# Patient Record
Sex: FEMALE | Race: WHITE | NOT HISPANIC OR LATINO | Employment: OTHER | URBAN - METROPOLITAN AREA
[De-identification: names, ages, dates, MRNs, and addresses within clinical notes are randomized per-mention and may not be internally consistent; named-entity substitution may affect disease eponyms.]

---

## 2018-03-20 ENCOUNTER — HOSPITAL ENCOUNTER (OUTPATIENT)
Facility: HOSPITAL | Age: 58
Discharge: HOME | End: 2018-03-20
Attending: EMERGENCY MEDICINE | Admitting: HOSPITALIST
Payer: COMMERCIAL

## 2018-03-20 VITALS
DIASTOLIC BLOOD PRESSURE: 61 MMHG | SYSTOLIC BLOOD PRESSURE: 121 MMHG | TEMPERATURE: 100 F | WEIGHT: 135 LBS | HEART RATE: 87 BPM | RESPIRATION RATE: 18 BRPM | BODY MASS INDEX: 22.49 KG/M2 | HEIGHT: 65 IN | OXYGEN SATURATION: 96 %

## 2018-03-20 DIAGNOSIS — S00.83XA CONTUSION OF FOREHEAD, INITIAL ENCOUNTER: ICD-10-CM

## 2018-03-20 DIAGNOSIS — R55 SYNCOPE AND COLLAPSE: ICD-10-CM

## 2018-03-20 DIAGNOSIS — I95.1 ORTHOSTATIC HYPOTENSION: Primary | ICD-10-CM

## 2018-03-20 PROBLEM — J20.9 ACUTE BRONCHITIS: Status: ACTIVE | Noted: 2018-03-20

## 2018-03-20 PROBLEM — E87.6 HYPOKALEMIA, GASTROINTESTINAL LOSSES: Status: ACTIVE | Noted: 2018-03-20

## 2018-03-20 PROBLEM — R19.7 DIARRHEA: Status: ACTIVE | Noted: 2018-03-20

## 2018-03-20 LAB
ALBUMIN SERPL BCP-MCNC: 3.5 G/DL
ALP SERPL-CCNC: 102 U/L
ALT SERPL W/O P-5'-P-CCNC: 47 U/L
ANION GAP SERPL CALC-SCNC: 11 MMOL/L
AST SERPL-CCNC: 25 U/L
BACTERIA #/AREA URNS HPF: ABNORMAL /HPF
BASOPHILS # BLD AUTO: 0 K/UL
BASOPHILS NFR BLD: 0.1 %
BILIRUB SERPL-MCNC: 0.4 MG/DL
BILIRUB UR QL STRIP: NEGATIVE
BNP SERPL-MCNC: <10 PG/ML
BUN SERPL-MCNC: 9 MG/DL
C DIFF GDH STL QL: NEGATIVE
C DIFF TOX A+B STL QL IA: NEGATIVE
CALCIUM SERPL-MCNC: 8.4 MG/DL
CHLORIDE SERPL-SCNC: 103 MMOL/L
CLARITY UR: CLEAR
CO2 SERPL-SCNC: 24 MMOL/L
COLOR UR: YELLOW
CREAT SERPL-MCNC: 0.7 MG/DL
D DIMER PPP IA.FEU-MCNC: 0.58 MG/L FEU
DIFFERENTIAL METHOD: ABNORMAL
EOSINOPHIL # BLD AUTO: 0 K/UL
EOSINOPHIL NFR BLD: 0.1 %
ERYTHROCYTE [DISTWIDTH] IN BLOOD BY AUTOMATED COUNT: 12.3 %
EST. GFR  (AFRICAN AMERICAN): >60 ML/MIN/1.73 M^2
EST. GFR  (NON AFRICAN AMERICAN): >60 ML/MIN/1.73 M^2
GLUCOSE SERPL-MCNC: 117 MG/DL
GLUCOSE UR QL STRIP: NEGATIVE
HCT VFR BLD AUTO: 41.3 %
HGB BLD-MCNC: 14.1 G/DL
HGB UR QL STRIP: ABNORMAL
HYALINE CASTS #/AREA URNS LPF: 1 /LPF
KETONES UR QL STRIP: NEGATIVE
LEUKOCYTE ESTERASE UR QL STRIP: NEGATIVE
LYMPHOCYTES # BLD AUTO: 0.7 K/UL
LYMPHOCYTES NFR BLD: 13.2 %
MAGNESIUM SERPL-MCNC: 2 MG/DL
MCH RBC QN AUTO: 29 PG
MCHC RBC AUTO-ENTMCNC: 34 G/DL
MCV RBC AUTO: 85 FL
MICROSCOPIC COMMENT: ABNORMAL
MONOCYTES # BLD AUTO: 0.6 K/UL
MONOCYTES NFR BLD: 12.9 %
NEUTROPHILS # BLD AUTO: 3.7 K/UL
NEUTROPHILS NFR BLD: 73.7 %
NITRITE UR QL STRIP: NEGATIVE
PH UR STRIP: 6 [PH] (ref 5–8)
PHOSPHATE SERPL-MCNC: 3.1 MG/DL
PLATELET # BLD AUTO: 243 K/UL
PMV BLD AUTO: 7.7 FL
POCT GLUCOSE: 104 MG/DL (ref 70–110)
POTASSIUM SERPL-SCNC: 3.4 MMOL/L
PROT SERPL-MCNC: 6.7 G/DL
PROT UR QL STRIP: NEGATIVE
RBC # BLD AUTO: 4.84 M/UL
RBC #/AREA URNS HPF: 5 /HPF (ref 0–4)
SODIUM SERPL-SCNC: 138 MMOL/L
SP GR UR STRIP: <=1.005 (ref 1–1.03)
SQUAMOUS #/AREA URNS HPF: 4 /HPF
TROPONIN I SERPL DL<=0.01 NG/ML-MCNC: <0.006 NG/ML
URATE CRY URNS QL MICRO: ABNORMAL
URN SPEC COLLECT METH UR: ABNORMAL
UROBILINOGEN UR STRIP-ACNC: NEGATIVE EU/DL
WBC # BLD AUTO: 5 K/UL
WBC #/AREA STL HPF: NORMAL /[HPF]
WBC #/AREA URNS HPF: 8 /HPF (ref 0–5)

## 2018-03-20 PROCEDURE — 81000 URINALYSIS NONAUTO W/SCOPE: CPT

## 2018-03-20 PROCEDURE — 87046 STOOL CULTR AEROBIC BACT EA: CPT | Mod: 59

## 2018-03-20 PROCEDURE — 87449 NOS EACH ORGANISM AG IA: CPT

## 2018-03-20 PROCEDURE — G0378 HOSPITAL OBSERVATION PER HR: HCPCS

## 2018-03-20 PROCEDURE — 87045 FECES CULTURE AEROBIC BACT: CPT

## 2018-03-20 PROCEDURE — 25000003 PHARM REV CODE 250: Performed by: NURSE PRACTITIONER

## 2018-03-20 PROCEDURE — 93005 ELECTROCARDIOGRAM TRACING: CPT

## 2018-03-20 PROCEDURE — 83880 ASSAY OF NATRIURETIC PEPTIDE: CPT

## 2018-03-20 PROCEDURE — 85025 COMPLETE CBC W/AUTO DIFF WBC: CPT

## 2018-03-20 PROCEDURE — 94640 AIRWAY INHALATION TREATMENT: CPT

## 2018-03-20 PROCEDURE — 25000003 PHARM REV CODE 250: Performed by: EMERGENCY MEDICINE

## 2018-03-20 PROCEDURE — 89055 LEUKOCYTE ASSESSMENT FECAL: CPT

## 2018-03-20 PROCEDURE — 36415 COLL VENOUS BLD VENIPUNCTURE: CPT

## 2018-03-20 PROCEDURE — 83735 ASSAY OF MAGNESIUM: CPT

## 2018-03-20 PROCEDURE — 80053 COMPREHEN METABOLIC PANEL: CPT

## 2018-03-20 PROCEDURE — 87427 SHIGA-LIKE TOXIN AG IA: CPT

## 2018-03-20 PROCEDURE — 84100 ASSAY OF PHOSPHORUS: CPT

## 2018-03-20 PROCEDURE — 99285 EMERGENCY DEPT VISIT HI MDM: CPT | Mod: 25

## 2018-03-20 PROCEDURE — 84484 ASSAY OF TROPONIN QUANT: CPT

## 2018-03-20 PROCEDURE — 25000242 PHARM REV CODE 250 ALT 637 W/ HCPCS: Performed by: NURSE PRACTITIONER

## 2018-03-20 PROCEDURE — 94761 N-INVAS EAR/PLS OXIMETRY MLT: CPT

## 2018-03-20 PROCEDURE — 85379 FIBRIN DEGRADATION QUANT: CPT

## 2018-03-20 PROCEDURE — 96360 HYDRATION IV INFUSION INIT: CPT

## 2018-03-20 PROCEDURE — 87209 SMEAR COMPLEX STAIN: CPT

## 2018-03-20 RX ORDER — LANOLIN ALCOHOL/MO/W.PET/CERES
800 CREAM (GRAM) TOPICAL
Status: DISCONTINUED | OUTPATIENT
Start: 2018-03-20 | End: 2018-03-20 | Stop reason: HOSPADM

## 2018-03-20 RX ORDER — LOPERAMIDE HYDROCHLORIDE 2 MG/1
2 CAPSULE ORAL 4 TIMES DAILY PRN
Status: DISCONTINUED | OUTPATIENT
Start: 2018-03-20 | End: 2018-03-20 | Stop reason: HOSPADM

## 2018-03-20 RX ORDER — IBUPROFEN 200 MG
16 TABLET ORAL
Status: DISCONTINUED | OUTPATIENT
Start: 2018-03-20 | End: 2018-03-20 | Stop reason: HOSPADM

## 2018-03-20 RX ORDER — AMOXICILLIN 250 MG
1 CAPSULE ORAL 2 TIMES DAILY
Status: DISCONTINUED | OUTPATIENT
Start: 2018-03-20 | End: 2018-03-20 | Stop reason: HOSPADM

## 2018-03-20 RX ORDER — RAMELTEON 8 MG/1
8 TABLET ORAL NIGHTLY PRN
Status: DISCONTINUED | OUTPATIENT
Start: 2018-03-20 | End: 2018-03-20 | Stop reason: HOSPADM

## 2018-03-20 RX ORDER — ACETAMINOPHEN 500 MG
1000 TABLET ORAL EVERY 6 HOURS PRN
Status: DISCONTINUED | OUTPATIENT
Start: 2018-03-20 | End: 2018-03-20 | Stop reason: HOSPADM

## 2018-03-20 RX ORDER — ONDANSETRON 2 MG/ML
8 INJECTION INTRAMUSCULAR; INTRAVENOUS EVERY 8 HOURS PRN
Status: DISCONTINUED | OUTPATIENT
Start: 2018-03-20 | End: 2018-03-20 | Stop reason: HOSPADM

## 2018-03-20 RX ORDER — ALBUTEROL SULFATE 5 MG/ML
2.5 SOLUTION RESPIRATORY (INHALATION) EVERY 6 HOURS
Status: DISCONTINUED | OUTPATIENT
Start: 2018-03-20 | End: 2018-03-20 | Stop reason: HOSPADM

## 2018-03-20 RX ORDER — GUAIFENESIN/DEXTROMETHORPHAN 100-10MG/5
10 SYRUP ORAL EVERY 4 HOURS PRN
Status: DISCONTINUED | OUTPATIENT
Start: 2018-03-20 | End: 2018-03-20 | Stop reason: HOSPADM

## 2018-03-20 RX ORDER — POTASSIUM CHLORIDE 20 MEQ/15ML
60 SOLUTION ORAL
Status: DISCONTINUED | OUTPATIENT
Start: 2018-03-20 | End: 2018-03-20 | Stop reason: HOSPADM

## 2018-03-20 RX ORDER — GLUCAGON 1 MG
1 KIT INJECTION
Status: DISCONTINUED | OUTPATIENT
Start: 2018-03-20 | End: 2018-03-20 | Stop reason: HOSPADM

## 2018-03-20 RX ORDER — ACETAMINOPHEN 325 MG/1
650 TABLET ORAL EVERY 4 HOURS PRN
Status: DISCONTINUED | OUTPATIENT
Start: 2018-03-20 | End: 2018-03-20 | Stop reason: HOSPADM

## 2018-03-20 RX ORDER — IBUPROFEN 200 MG
24 TABLET ORAL
Status: DISCONTINUED | OUTPATIENT
Start: 2018-03-20 | End: 2018-03-20 | Stop reason: HOSPADM

## 2018-03-20 RX ORDER — SODIUM CHLORIDE 9 MG/ML
INJECTION, SOLUTION INTRAVENOUS CONTINUOUS
Status: DISCONTINUED | OUTPATIENT
Start: 2018-03-20 | End: 2018-03-20 | Stop reason: HOSPADM

## 2018-03-20 RX ORDER — SODIUM CHLORIDE 0.9 % (FLUSH) 0.9 %
5 SYRINGE (ML) INJECTION
Status: DISCONTINUED | OUTPATIENT
Start: 2018-03-20 | End: 2018-03-20 | Stop reason: HOSPADM

## 2018-03-20 RX ORDER — POTASSIUM CHLORIDE 20 MEQ/15ML
40 SOLUTION ORAL
Status: DISCONTINUED | OUTPATIENT
Start: 2018-03-20 | End: 2018-03-20 | Stop reason: HOSPADM

## 2018-03-20 RX ADMIN — SODIUM CHLORIDE 1000 ML: 0.9 INJECTION, SOLUTION INTRAVENOUS at 03:03

## 2018-03-20 RX ADMIN — SODIUM CHLORIDE: 0.9 INJECTION, SOLUTION INTRAVENOUS at 01:03

## 2018-03-20 RX ADMIN — SODIUM CHLORIDE 500 ML: 0.9 INJECTION, SOLUTION INTRAVENOUS at 05:03

## 2018-03-20 RX ADMIN — SODIUM CHLORIDE: 0.9 INJECTION, SOLUTION INTRAVENOUS at 06:03

## 2018-03-20 RX ADMIN — ALBUTEROL SULFATE 2.5 MG: 2.5 SOLUTION RESPIRATORY (INHALATION) at 01:03

## 2018-03-20 RX ADMIN — POTASSIUM CHLORIDE 40 MEQ: 20 SOLUTION ORAL at 01:03

## 2018-03-20 NOTE — NURSING
Discharge instructions given to patient she verbalized understanding discontinued piv in right hand and removed cardiac monitor. All questions and concerns answered. Transported pt to car via wheelchair at 1707 relinquished care.

## 2018-03-20 NOTE — HPI
Amanda Reyes is a 56 y/o female with PMHx of elevated liver enzymes who presented to the ED last night following a syncopal episode at home.  Mrs. Reyes reports that she has had cough, headache, chills, nausea and diarrhea x 3-4 days.  She has only eaten popsicles and jello since Saturday.  Reports going into the bathroom at her home last night because she felt nauseated, suddenly became weak and fell to the floor, hitting her head.  Her  found her on the floor immediately following her fall, at which time she lost consciousness for about 20 seconds.  Denies chest pain, SOB, unilateral weakness, vision changes, vomiting, and hematochezia.  CT head was negative for acute process.  Labs are unremarkable with exception to mild hypokalemia.  Orthostatic blood pressures were taken upon her arrival to the ED with positive change noted from sitting to standing.  Mrs. Reyes is being admitted to the service of hospital medicine for further observation.

## 2018-03-20 NOTE — SUBJECTIVE & OBJECTIVE
Past Medical History:   Diagnosis Date    Anxiety     Duodenal ulcer     at 17 years old    Elevated liver enzymes     2 yrs ago- thought she had Hep C, but with further testing Dr. Colon informed her she did not have Hepatitis C    Postmenopausal     Postmenopausal     Postsurgical dumping syndrome     after cholecystectomy       Past Surgical History:   Procedure Laterality Date     SECTION      CHOLECYSTECTOMY      around 9859-6282    SINUS SURGERY         Review of patient's allergies indicates:   Allergen Reactions    Bactrim [sulfamethoxazole-trimethoprim]      Nausea, vomiting    Kenalog [triamcinolone acetonide] Other (See Comments)     Large red, dip at inj site    Tequin [gatifloxacin]      hallucinations       No current facility-administered medications on file prior to encounter.      Current Outpatient Prescriptions on File Prior to Encounter   Medication Sig    omeprazole (PRILOSEC) 40 MG capsule Take 1 capsule (40 mg total) by mouth once daily.    [DISCONTINUED] chlordiazepoxide-clidinium 5-2.5 mg (LIBRAX) 5-2.5 mg Cap Take 1 capsule by mouth every 8 (eight) hours as needed (abdominal pain/spasm).    [DISCONTINUED] naproxen sodium (ANAPROX) 220 MG tablet Take 220 mg by mouth every 12 (twelve) hours.     Family History     Problem Relation (Age of Onset)    Cancer Mother, Father    Heart disease Mother    Stroke Mother        Social History Main Topics    Smoking status: Never Smoker    Smokeless tobacco: Never Used    Alcohol use No      Comment: rarely    Drug use: No    Sexual activity: Not on file     Review of Systems   Constitutional: Positive for chills, fatigue and fever (subjective).   HENT: Positive for congestion. Negative for sore throat and trouble swallowing.    Eyes: Negative for photophobia and visual disturbance.   Respiratory: Positive for cough (productive), chest tightness and wheezing. Negative for shortness of breath.    Cardiovascular: Negative for  chest pain and palpitations.   Gastrointestinal: Positive for diarrhea and nausea. Negative for abdominal pain, blood in stool, constipation and vomiting.   Genitourinary: Negative for dysuria, hematuria and urgency.   Musculoskeletal: Negative for neck pain and neck stiffness.   Neurological: Positive for syncope and light-headedness.   Psychiatric/Behavioral: Negative for agitation and confusion. The patient is not nervous/anxious.      Objective:     Vital Signs (Most Recent):  Temp: 97.7 °F (36.5 °C) (03/20/18 1158)  Pulse: 83 (03/20/18 1158)  Resp: 16 (03/20/18 1158)  BP: 134/67 (03/20/18 1158)  SpO2: 99 % (03/20/18 1158) Vital Signs (24h Range):  Temp:  [97.7 °F (36.5 °C)-98.4 °F (36.9 °C)] 97.7 °F (36.5 °C)  Pulse:  [73-87] 83  Resp:  [16-18] 16  SpO2:  [95 %-99 %] 99 %  BP: ()/(51-69) 134/67     Weight: 61.2 kg (135 lb)  Body mass index is 22.47 kg/m².    Physical Exam   Constitutional: She is oriented to person, place, and time. She appears well-developed and well-nourished.   HENT:   Head: Normocephalic and atraumatic.   Eyes: Conjunctivae and EOM are normal. Pupils are equal, round, and reactive to light.   Neck: Normal range of motion. Neck supple.   Cardiovascular: Normal rate, regular rhythm and intact distal pulses.    Pulmonary/Chest: Effort normal. No respiratory distress. She has wheezes.   Abdominal: Soft. Bowel sounds are normal. She exhibits no distension. There is no tenderness. There is no guarding.   Musculoskeletal: Normal range of motion. She exhibits no edema.   Neurological: She is alert and oriented to person, place, and time. No cranial nerve deficit.   Skin: Skin is warm and dry. No rash noted.   Psychiatric: She has a normal mood and affect. Her behavior is normal. Judgment normal.         CRANIAL NERVES     CN III, IV, VI   Pupils are equal, round, and reactive to light.  Extraocular motions are normal.        Significant Labs:   CBC:   Recent Labs  Lab 03/20/18  0404   WBC  5.00   HGB 14.1   HCT 41.3        CMP:   Recent Labs  Lab 03/20/18  0403      K 3.4*      CO2 24   *   BUN 9   CREATININE 0.7   CALCIUM 8.4*   PROT 6.7   ALBUMIN 3.5   BILITOT 0.4   ALKPHOS 102   AST 25   ALT 47*   ANIONGAP 11   EGFRNONAA >60     Urine Studies:   Recent Labs  Lab 03/20/18  0425   COLORU Yellow   APPEARANCEUA Clear   PHUR 6.0   SPECGRAV <=1.005*   PROTEINUA Negative   GLUCUA Negative   KETONESU Negative   BILIRUBINUA Negative   OCCULTUA 1+*   NITRITE Negative   UROBILINOGEN Negative   LEUKOCYTESUR Negative   RBCUA 5*   WBCUA 8*   BACTERIA Few*   SQUAMEPITHEL 4   HYALINECASTS 1       Significant Imaging:   Amanda Reyes #[id number] (CSN: [id number])  (57 y.o. F)  (Adm: 03/20/18)   [id number]   Radiology Results (last 7 days)     Procedure Component Value Units Date/Time   CT Head Without Contrast [[id number]] Resulted: 03/20/18 0818   Order Status: Completed Updated: 03/20/18 0820   Narrative:     EXAMINATION:  CT HEAD WITHOUT CONTRAST    CLINICAL HISTORY:  Syncope/fainting;    TECHNIQUE:  Low dose axial images were obtained through the head.  Coronal and sagittal reformations were also performed. Contrast was not administered.    COMPARISON:  None.    FINDINGS:  No cranial fracture is identified.  Scalp edema or hematoma is not noted.  The internal auditory canals are sharp and symmetric.  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  There is no mass effect or midline shift.  The ventricles are of normal size and symmetric.  The gray-white matter differentiation is normal.  Within the brain parenchyma no hyper or hypo dense lesions consistent with tumor, edema, CVA or hemorrhage is seen.   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      Electronically signed by: Wilner Abad MD  Date: 03/20/2018  Time: 08:18

## 2018-03-20 NOTE — H&P
[facility] Hospital Medicine  History & Physical    Patient Name: Amanda Reyes  MRN: [id number]  Admission Date: 3/20/2018  Attending Physician: Florencio Ro MD   Primary Care Provider: Primary Doctor No         Patient information was obtained from patient, past medical records and ER records.     Subjective:     Principal Problem:Syncope and collapse    Chief Complaint:   Chief Complaint   Patient presents with    Loss of Consciousness     Passed out in the bathroom tonight. LOC for about 20 minutes. Hit her head when she passed out. Small hematoma on left eyebrow    Nausea    cold symtoms     cough, chest congestion that won't clear        HPI: Amanda Reyes is a 58 y/o female with PMHx of elevated liver enzymes who presented to the ED last night following a syncopal episode at home.  Mrs. Reyes reports that she has had cough, headache, chills, nausea and diarrhea x 3-4 days.  She has only eaten popsicles and jello since Saturday.  Reports going into the bathroom at her home last night because she felt nauseated, suddenly became weak and fell to the floor, hitting her head.  Her  found her on the floor immediately following her fall, at which time she lost consciousness for about 20 seconds.  Denies chest pain, SOB, unilateral weakness, vision changes, vomiting, and hematochezia.  CT head was negative for acute process.  Labs are unremarkable with exception to mild hypokalemia.  Orthostatic blood pressures were taken upon her arrival to the ED with positive change noted from sitting to standing.  Mrs. Reyes is being admitted to the service of hospital medicine for further observation.    Past Medical History:   Diagnosis Date    Anxiety     Duodenal ulcer     at 17 years old    Elevated liver enzymes     2 yrs ago- thought she had Hep C, but with further testing Dr. Colon informed her she did not have Hepatitis C    Postmenopausal     Postmenopausal      Postsurgical dumping syndrome     after cholecystectomy       Past Surgical History:   Procedure Laterality Date     SECTION      CHOLECYSTECTOMY      around 6913-8345    SINUS SURGERY         Review of patient's allergies indicates:   Allergen Reactions    Bactrim [sulfamethoxazole-trimethoprim]      Nausea, vomiting    Kenalog [triamcinolone acetonide] Other (See Comments)     Large red, dip at inj site    Tequin [gatifloxacin]      hallucinations       No current facility-administered medications on file prior to encounter.      Current Outpatient Prescriptions on File Prior to Encounter   Medication Sig    omeprazole (PRILOSEC) 40 MG capsule Take 1 capsule (40 mg total) by mouth once daily.    [DISCONTINUED] chlordiazepoxide-clidinium 5-2.5 mg (LIBRAX) 5-2.5 mg Cap Take 1 capsule by mouth every 8 (eight) hours as needed (abdominal pain/spasm).    [DISCONTINUED] naproxen sodium (ANAPROX) 220 MG tablet Take 220 mg by mouth every 12 (twelve) hours.     Family History     Problem Relation (Age of Onset)    Cancer Mother, Father    Heart disease Mother    Stroke Mother        Social History Main Topics    Smoking status: Never Smoker    Smokeless tobacco: Never Used    Alcohol use No      Comment: rarely    Drug use: No    Sexual activity: Not on file     Review of Systems   Constitutional: Positive for chills, fatigue and fever (subjective).   HENT: Positive for congestion. Negative for sore throat and trouble swallowing.    Eyes: Negative for photophobia and visual disturbance.   Respiratory: Positive for cough (productive), chest tightness and wheezing. Negative for shortness of breath.    Cardiovascular: Negative for chest pain and palpitations.   Gastrointestinal: Positive for diarrhea and nausea. Negative for abdominal pain, blood in stool, constipation and vomiting.   Genitourinary: Negative for dysuria, hematuria and urgency.   Musculoskeletal: Negative for neck pain and neck stiffness.    Neurological: Positive for syncope and light-headedness.   Psychiatric/Behavioral: Negative for agitation and confusion. The patient is not nervous/anxious.      Objective:     Vital Signs (Most Recent):  Temp: 97.7 °F (36.5 °C) (03/20/18 1158)  Pulse: 83 (03/20/18 1158)  Resp: 16 (03/20/18 1158)  BP: 134/67 (03/20/18 1158)  SpO2: 99 % (03/20/18 1158) Vital Signs (24h Range):  Temp:  [97.7 °F (36.5 °C)-98.4 °F (36.9 °C)] 97.7 °F (36.5 °C)  Pulse:  [73-87] 83  Resp:  [16-18] 16  SpO2:  [95 %-99 %] 99 %  BP: ()/(51-69) 134/67     Weight: 61.2 kg (135 lb)  Body mass index is 22.47 kg/m².    Physical Exam   Constitutional: She is oriented to person, place, and time. She appears well-developed and well-nourished.   HENT:   Head: Normocephalic and atraumatic.   Eyes: Conjunctivae and EOM are normal. Pupils are equal, round, and reactive to light.   Neck: Normal range of motion. Neck supple.   Cardiovascular: Normal rate, regular rhythm and intact distal pulses.    Pulmonary/Chest: Effort normal. No respiratory distress. She has wheezes.   Abdominal: Soft. Bowel sounds are normal. She exhibits no distension. There is no tenderness. There is no guarding.   Musculoskeletal: Normal range of motion. She exhibits no edema.   Neurological: She is alert and oriented to person, place, and time. No cranial nerve deficit.   Skin: Skin is warm and dry. No rash noted.   Psychiatric: She has a normal mood and affect. Her behavior is normal. Judgment normal.         CRANIAL NERVES     CN III, IV, VI   Pupils are equal, round, and reactive to light.  Extraocular motions are normal.        Significant Labs:   CBC:   Recent Labs  Lab 03/20/18  0404   WBC 5.00   HGB 14.1   HCT 41.3        CMP:   Recent Labs  Lab 03/20/18  0403      K 3.4*      CO2 24   *   BUN 9   CREATININE 0.7   CALCIUM 8.4*   PROT 6.7   ALBUMIN 3.5   BILITOT 0.4   ALKPHOS 102   AST 25   ALT 47*   ANIONGAP 11   EGFRNONAA >60     Urine  Studies:   Recent Labs  Lab 03/20/18  0425   COLORU Yellow   APPEARANCEUA Clear   PHUR 6.0   SPECGRAV <=1.005*   PROTEINUA Negative   GLUCUA Negative   KETONESU Negative   BILIRUBINUA Negative   OCCULTUA 1+*   NITRITE Negative   UROBILINOGEN Negative   LEUKOCYTESUR Negative   RBCUA 5*   WBCUA 8*   BACTERIA Few*   SQUAMEPITHEL 4   HYALINECASTS 1       Significant Imaging:   Amanda Reyes #[id number] (CSN: [id number])  (57 y.o. F)  (Adm: 03/20/18)   [id number]   Radiology Results (last 7 days)     Procedure Component Value Units Date/Time   CT Head Without Contrast [[id number]] Resulted: 03/20/18 0818   Order Status: Completed Updated: 03/20/18 0820   Narrative:     EXAMINATION:  CT HEAD WITHOUT CONTRAST    CLINICAL HISTORY:  Syncope/fainting;    TECHNIQUE:  Low dose axial images were obtained through the head.  Coronal and sagittal reformations were also performed. Contrast was not administered.    COMPARISON:  None.    FINDINGS:  No cranial fracture is identified.  Scalp edema or hematoma is not noted.  The internal auditory canals are sharp and symmetric.  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  There is no mass effect or midline shift.  The ventricles are of normal size and symmetric.  The gray-white matter differentiation is normal.  Within the brain parenchyma no hyper or hypo dense lesions consistent with tumor, edema, CVA or hemorrhage is seen.   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      Electronically signed by: Wilner Abad MD  Date: 03/20/2018  Time: 08:18            Assessment/Plan:     * Syncope and collapse    Likely due to orthostatic hypotension  CT head without contrast-done  CMP, CBC, Mg, Phos, troponin-WNL with exception to mild  hypokalemia  EKG-NSR; continue to monitor Telemetry  IV fluids            Acute bronchitis    CXR ordered  Bronchodilators  Robitussin prn          Diarrhea    Stool studies  Imodium prn          Hypokalemia, gastrointestinal losses    Replace potassium  Trend lab  Imodium as needed for diarrhea  Stool studies              VTE Risk Mitigation         Ordered     IP VTE LOW RISK PATIENT  Once      03/20/18 0626      Time spent seeing patient( greater than 1/2 spent in direct contact) : 45 minutes       MECHELLE Anthony  Department of Hospital Medicine   [facility]

## 2018-03-20 NOTE — PLAN OF CARE
03/20/18 1629   Final Note   Assessment Type Final Discharge Note   Discharge Disposition Home

## 2018-03-20 NOTE — ASSESSMENT & PLAN NOTE
Likely due to orthostatic hypotension  CT head without contrast-done  CMP, CBC, Mg, Phos, troponin-WNL with exception to mild hypokalemia  EKG-NSR; continue to monitor Telemetry  IV fluids

## 2018-03-20 NOTE — ED NOTES
Pt presents to ER for evaluation of syncopal episode happened tonight. Pt reports having chest and nasal congestion x3 days with chest tightness and productive cough, also c/o diarrhea. Pt started with nausea and went to the bathroom, fell on floor because she felt as if she was going to pass out, and hit her head. Pt  reports going into bathroom and found pt on floor and then she passed out for approx 20 seconds. Upon arrival, pt orthostatics positive from sitting to standing. Pt is aaox4, chest rise symmetrical, c/o chest tightness from coughing. Pt in bed, locked, lowest position, side rails x2, connected to continuous NIBP, pulse ox, and heart monitor, will continue to monitor.

## 2018-03-20 NOTE — PLAN OF CARE
03/20/18 0805   Patient Assessment/Suction   Level of Consciousness (AVPU) alert   PRE-TX-O2-ETCO2   O2 Device (Oxygen Therapy) room air   SpO2 97 %   Pulse Oximetry Type Intermittent   $ Pulse Oximetry - Multiple Charge Pulse Oximetry - Multiple   Pulse 80   Resp 16      (3/31) Glu 117

## 2018-03-20 NOTE — PLAN OF CARE
03/20/18 1330   Patient Assessment/Suction   Level of Consciousness (AVPU) alert   All Lung Fields Breath Sounds clear;diminished   PRE-TX-O2-ETCO2   O2 Device (Oxygen Therapy) room air   SpO2 98 %   Pulse Oximetry Type Intermittent   $ Pulse Oximetry - Multiple Charge Pulse Oximetry - Multiple   Pulse 80   Resp 16   Aerosol Therapy   $ Aerosol Therapy Charges Aerosol Treatment   Respiratory Treatment Status given   SVN/Inhaler Treatment Route mask   Position During Treatment HOB at 45 degrees   Patient Tolerance good   Post-Treatment   Post-treatment Heart Rate (beats/min) 82   Post-treatment Resp Rate (breaths/min) 16   All Fields Breath Sounds unchanged

## 2018-03-21 PROBLEM — E87.6 HYPOKALEMIA, GASTROINTESTINAL LOSSES: Status: ACTIVE | Noted: 2018-03-21

## 2018-03-21 PROBLEM — R19.7 DIARRHEA: Status: ACTIVE | Noted: 2018-03-21

## 2018-03-21 PROBLEM — J20.9 ACUTE BRONCHITIS: Status: ACTIVE | Noted: 2018-03-21

## 2018-03-21 LAB — O+P STL TRI STN: NORMAL

## 2018-03-21 NOTE — DISCHARGE SUMMARY
[facility] Hospital Medicine  Discharge Summary      Patient Name: Amanda Reyes  MRN: [id number]  Admission Date: 3/20/2018  Hospital Length of Stay: 0 days  Discharge Date and Time: 3/20/2018  5:27 PM  Attending Physician: No att. providers found   Discharging Provider: Florencio Ro MD  Primary Care Provider: Primary Doctor Kenisha        HPI: Amanda Reyes is a 58 y/o female with PMHx of elevated liver enzymes who presented to the ED last night following a syncopal episode at home.  Mrs. Reyes reports that she has had cough, headache, chills, nausea and diarrhea x 3-4 days.  She has only eaten popsicles and jello since Saturday.  Reports going into the bathroom at her home last night because she felt nauseated, suddenly became weak and fell to the floor, hitting her head.  Her  found her on the floor immediately following her fall, at which time she lost consciousness for about 20 seconds.  Denies chest pain, SOB, unilateral weakness, vision changes, vomiting, and hematochezia.  CT head was negative for acute process.  Labs are unremarkable with exception to mild hypokalemia.  Orthostatic blood pressures were taken upon her arrival to the ED with positive change noted from sitting to standing.  Mrs. Reyes is being admitted to the service of hospital medicine for further observation.    * No surgery found *      Hospital Course:   Pt was given at least 2 liters of saline IV in a twelve hour period.  All her labwork on admission was normal.  Stool sample was negative for neutrophils.  Stool was sent for culture, Cdiff EIA, and E.coli 0157 antigen.  All those were pending on discharge.  After the IVF were given, she felt much better; she ambulated without dizziness and unsteadiness.    PE:  Constitutional: She is oriented to person, place, and time. She appears well-developed and well-nourished.   HENT: moist mucus membranes  Cardiovascular: Normal rate, regular rhythm and intact  distal pulses.    Pulmonary/Chest: Effort normal. No respiratory distress. She has wheezes (mild).     Consults:     Final Active Diagnoses:    Diagnosis Date Noted POA    PRINCIPAL PROBLEM:  Syncope and collapse [R55] 03/20/2018 Yes    Hypokalemia, gastrointestinal losses [E87.6] 03/20/2018 Unknown    Diarrhea [R19.7] 03/20/2018 Unknown    Acute bronchitis [J20.9] 03/20/2018 Unknown      Problems Resolved During this Admission:    Diagnosis Date Noted Date Resolved POA      Discharged Condition: good    Disposition: Home or Self Care    Follow Up:  Follow-up Information     Return to ER if you have any concerning symptoms, such as chest pain, shortness of breath, lightheadedness, or loss of consciousness.               Patient Instructions:     Diet Adult Regular     Activity as tolerated       Medications:  Reconciled Home Medications:   Discharge Medication List as of 3/20/2018  4:53 PM      STOP taking these medications       chlordiazepoxide-clidinium 5-2.5 mg (LIBRAX) 5-2.5 mg Cap Comments:   Reason for Stopping:         naproxen sodium (ANAPROX) 220 MG tablet Comments:   Reason for Stopping:         omeprazole (PRILOSEC) 40 MG capsule Comments:   Reason for Stopping:               Significant Diagnostic Studies:   BMP  Lab Results   Component Value Date     03/20/2018    K 3.4 (L) 03/20/2018     03/20/2018    CO2 24 03/20/2018    BUN 9 03/20/2018    CREATININE 0.7 03/20/2018    CALCIUM 8.4 (L) 03/20/2018    ANIONGAP 11 03/20/2018    ESTGFRAFRICA >60 03/20/2018    EGFRNONAA >60 03/20/2018     Lab Results   Component Value Date    WBC 5.00 03/20/2018    HGB 14.1 03/20/2018    HCT 41.3 03/20/2018    MCV 85 03/20/2018     03/20/2018         Pending Diagnostic Studies:     Procedure Component Value Units Date/Time    Stool Exam-Ova,Cysts,Parasites [[id number]] Collected:  03/20/18 1331    Order Status:  Sent Lab Status:  In process Updated:  03/20/18 1331    Specimen:  Stool from Stool          Indwelling Lines/Drains at time of discharge:   Lines/Drains/Airways          No matching active lines, drains, or airways          Time spent on the discharge of patient: 20 minutes  Patient was seen and examined on the date of discharge and determined to be suitable for discharge.         Florencio Ro MD  Department of Hospital Medicine  [facility]

## 2018-03-22 LAB
E COLI SXT1 STL QL IA: NEGATIVE
E COLI SXT2 STL QL IA: NEGATIVE

## 2018-03-22 NOTE — ED PROVIDER NOTES
Encounter Date: 3/20/2018       History     Chief Complaint   Patient presents with    Loss of Consciousness     Passed out in the bathroom tonight. LOC for about 20 minutes. Hit her head when she passed out. Small hematoma on left eyebrow    Nausea    cold symtoms     cough, chest congestion that won't clear     This patient's a 57-year-old female presenting to the emergency department [facility] after passing out in the bathroom.  She reports she has been sick with flulike illness for the previous 3 days and has reduced oral liquid and solid food intake.  She reports feeling lightheaded just prior to hitting her head on the left thigh just above the eyebrow against the wall and the bathroom.  She denies any neurologic symptoms now, including confusion.  She denies difficulty breathing, chest pain.  She denies taking anything for symptoms prior to arrival.          Review of patient's allergies indicates:   Allergen Reactions    Bactrim [sulfamethoxazole-trimethoprim]      Nausea, vomiting    Kenalog [triamcinolone acetonide] Other (See Comments)     Large red, dip at inj site    Tequin [gatifloxacin]      hallucinations     Past Medical History:   Diagnosis Date    Anxiety     Duodenal ulcer     at 17 years old    Elevated liver enzymes     2 yrs ago- thought she had Hep C, but with further testing Dr. Colon informed her she did not have Hepatitis C    Postmenopausal     Postmenopausal     Postsurgical dumping syndrome     after cholecystectomy     Past Surgical History:   Procedure Laterality Date     SECTION      CHOLECYSTECTOMY      around [id number]    SINUS SURGERY       Family History   Problem Relation Age of Onset    Cancer Mother     Stroke Mother     Heart disease Mother     Cancer Father      Social History   Substance Use Topics    Smoking status: Never Smoker    Smokeless tobacco: Never Used    Alcohol use No      Comment: rarely     Review of Systems   Constitutional:  Negative for fever.   HENT: Negative for dental problem.    Eyes: Negative for visual disturbance.   Respiratory: Negative for shortness of breath.    Gastrointestinal: Negative for abdominal pain.   Genitourinary: Negative for dysuria.   Musculoskeletal: Negative for neck pain.   Skin: Negative for wound.   Neurological: Positive for headaches.   Hematological: Does not bruise/bleed easily.   Psychiatric/Behavioral: Negative for confusion.       Physical Exam     Initial Vitals [03/20/18 0211]   BP Pulse Resp Temp SpO2   127/64 75 16 97.9 °F (36.6 °C) 97 %      MAP       85         Physical Exam    Nursing note and vitals reviewed.  Constitutional: She appears well-nourished. No distress.   HENT:   Head: Normocephalic and atraumatic.   Mouth/Throat: Oropharynx is clear and moist.   Mild left supraorbital contusion without ocular involvement, no lacerations   Eyes: Conjunctivae and EOM are normal.   Neck: Normal range of motion. Neck supple.   Cardiovascular: Normal rate and regular rhythm.   Pulmonary/Chest: No respiratory distress.   Abdominal: She exhibits no distension.   Musculoskeletal: Normal range of motion. She exhibits no edema or tenderness.   Neurological: She is alert and oriented to person, place, and time. She has normal strength. No cranial nerve deficit or sensory deficit.   Skin: Skin is warm and dry. Capillary refill takes less than 2 seconds.   Psychiatric: Thought content normal.         ED Course   Procedures  Labs Reviewed   CBC W/ AUTO DIFFERENTIAL - Abnormal; Notable for the following:        Result Value    MPV 7.7 (*)     Lymph # 0.7 (*)     Gran% 73.7 (*)     Lymph% 13.2 (*)     All other components within normal limits   COMPREHENSIVE METABOLIC PANEL - Abnormal; Notable for the following:     Potassium 3.4 (*)     Glucose 117 (*)     Calcium 8.4 (*)     ALT 47 (*)     All other components within normal limits   URINALYSIS - Abnormal; Notable for the following:     Specific Gravity, UA  <=1.005 (*)     Occult Blood UA 1+ (*)     All other components within normal limits   URINALYSIS MICROSCOPIC - Abnormal; Notable for the following:     RBC, UA 5 (*)     WBC, UA 8 (*)     Bacteria, UA Few (*)     All other components within normal limits   D DIMER, QUANTITATIVE - Abnormal; Notable for the following:     D-Dimer 0.58 (*)     All other components within normal limits   B-TYPE NATRIURETIC PEPTIDE   TROPONIN I   MAGNESIUM             Medical Decision Making:   ED Management:  This patient was interviewed and examined and is noted to be in no acute distress.  It does appear that her orthostasis and lightheaded syncopal episode is most probably associated with volume contraction in the setting of reduced oral intake during her illness preceding the event tonight.  She has positive orthostatic vital signs here and IV was established and she was provided bolus hydration.  Additional workup, including a CT scan of the head is found be unremarkable.  The patient does warrant observation until her lightheadedness and volume deficits can be corrected.  Case was discussed with Mrs. Lopez who agreed to admit the patient.  She and her family are in agreement with the need for observation and further monitoring.  She was transferred to an ounce bed in guarded condition.                        Clinical Impression:   The primary encounter diagnosis was Orthostatic hypotension. Diagnoses of Syncope and collapse and Contusion of forehead, initial encounter were also pertinent to this visit.    Disposition:   Disposition: Placed in Observation  Condition: Hailey Dee MD  03/21/18 6422

## 2018-03-24 LAB — BACTERIA STL CULT: NORMAL

## 2020-11-25 NOTE — PLAN OF CARE
Met with pt to complete her assessment.  Educated pt on the blue discharge folder and left the folder in the room.  Pt, who is independent with her self care, denies having any DME or home health services, lives with her spouse and 17 y/o daughter.  Pt verified her insurance as BC/BS and denies having a PCP stating that she never gets sick and has need of seeing anyone.  Pt's discharge disposition is home with no anticipated needs.  Pt verbalized that she is okay if the  makes her a hospital follow up appointment with a physician.       03/20/18 1050   Discharge Assessment   Assessment Type Discharge Planning Assessment   Confirmed/corrected address and phone number on facesheet? Yes   Assessment information obtained from? Patient   Prior to hospitilization cognitive status: Alert/Oriented   Prior to hospitalization functional status: Independent   Current cognitive status: Alert/Oriented   Current Functional Status: Independent   Lives With spouse;child(lee ann), dependent  (spouse and 17 y/o daughter)   Able to Return to Prior Arrangements yes   Is patient able to care for self after discharge? Yes   Who are your caregiver(s) and their phone number(s)? (spouse Amy Thurston, [phone])   Patient's perception of discharge disposition home or selfcare   Readmission Within The Last 30 Days no previous admission in last 30 days   Patient currently being followed by outpatient case management? No   Patient currently receives any other outside agency services? No   Equipment Currently Used at Home none   Do you have any problems affording any of your prescribed medications? No  (pharmacy is [facility])   Is the patient taking medications as prescribed? (Pt denies being on any meds at this time.)   Does the patient have transportation home? Yes   Does the patient receive services at the [facility]? No   Discharge Plan B Home with family   Patient/Family In Agreement With Plan yes      PROCEDURE: Right Sacroiliac joint injection and Right Knee Injection under fluoroscopic guidance        PROCEDURE DATE: 11/24/2020  PREOPERATIVE DIAGNOSIS: Sacroiliitis, Right knee pain   POSTOPERATIVE DIAGNOSIS: Sacroiliitis, Right knee pain     PROVIDER: Ivan Davey MD  Assistant(s): None    ANESTHESIA: Local, No Sedation    >> 0 mg of VERSED    >> 0 mcg of FENTANYL     INDICATION: The patient has a history of pain due to sacroiliitis unresponsive to conservative treatments. Fluoroscopy was used to optimize visualization of needle placement and to maximize safety.     PROCEDURE DESCRIPTION: The patient was seen and identified in the preoperative area. Risks, benefits, complications, and alternatives were discussed with the patient. The patient agreed to proceed with the procedure and signed the consent. The site and side of the procedure was identified and marked. An IV was started.     The patient was taken to the procedural suite. The patient was positioned in prone orientation on procedure table. A time out was performed prior to any intervention. The procedure, site, side, and allergies were stated and agreed to by all present. The lumbosacral area was widely prepped with ChloraPrep. The procedural site was draped in usual sterile fashion. Vital signs were closely monitored throughout this procedure. Conscious sedation was not used for this procedure.    The fluoroscopic camera was placed in contralateral oblique view and was adjusted until the anterior and posterior joint margins of the targeted sacroiliac joint aligned in linear array. The lower pole of the joint was identified, marked, and localized with 1% Lidocaine. A 25 gauge 3.5 inch spinal needle was introduced and advanced to the joint under fluoroscopic guidance. The joint space was entered and after negative aspiration, 2 mL of injectate was posited into the joint space. The needle was then withdrawn outside of the joint space and after negative  aspiration 1 mL of solution was injected outside of the joint space. The injectant solution used was comprised of 4 mL of 1% PF Lidocaine and 2 mL of Methylprednisolone (40 mg/mL). This techniques was performed for the above noted joint/s.    The Right knee was placed in a flexed position and after prepped and draped in the usual sterile fashion on both sides. The fluoroscopy machine was utilized to visualize the intra-articular space of the right knee and then using 1% lidocaine and a 27-gauge needle and a medial approach, the 25-gauge 2.5 inch needle was advanced under direct vision of fluoroscopy into the intra-articular space of the right knee, and after confirmation of the placement of the needle, was injected with 0.2 mL of Omnipaque 240 contrast under live fluoroscopy a solution containing 3 mL of the above solution was then injected.  The patient tolerated the procedure very well and was brought to the postprocedure recovery in excellent condition.      Description of Findings: Not applicable    Prosthetic devices, grafts, tissues, or devices implanted: None    Specimen Removed: No    Estimated Blood Loss: minimal    COMPLICATIONS: None    DISPOSITION / PLANS: The patient was transferred to the recovery area in a stable condition for observation. The patient was reexamined prior to discharge. There was no evidence of acute neurologic injury following the procedure.  Patient was discharged from the recovery room after meeting discharge criteria. Home discharge instructions were given to the patient by the staff.